# Patient Record
Sex: MALE | Race: BLACK OR AFRICAN AMERICAN | NOT HISPANIC OR LATINO | Employment: OTHER | URBAN - METROPOLITAN AREA
[De-identification: names, ages, dates, MRNs, and addresses within clinical notes are randomized per-mention and may not be internally consistent; named-entity substitution may affect disease eponyms.]

---

## 2017-02-28 ENCOUNTER — OFFICE VISIT (OUTPATIENT)
Dept: URGENT CARE | Facility: CLINIC | Age: 72
End: 2017-02-28

## 2017-02-28 VITALS
RESPIRATION RATE: 20 BRPM | WEIGHT: 206 LBS | SYSTOLIC BLOOD PRESSURE: 162 MMHG | DIASTOLIC BLOOD PRESSURE: 82 MMHG | HEART RATE: 85 BPM | BODY MASS INDEX: 27.9 KG/M2 | TEMPERATURE: 99.3 F | OXYGEN SATURATION: 93 % | HEIGHT: 72 IN

## 2017-02-28 DIAGNOSIS — E78.00 HYPERCHOLESTEREMIA: ICD-10-CM

## 2017-02-28 DIAGNOSIS — Z76.0 MEDICATION REFILL: ICD-10-CM

## 2017-02-28 DIAGNOSIS — I10 ESSENTIAL HYPERTENSION: ICD-10-CM

## 2017-02-28 PROCEDURE — 99204 OFFICE O/P NEW MOD 45 MIN: CPT | Performed by: NURSE PRACTITIONER

## 2017-02-28 RX ORDER — AMLODIPINE BESYLATE 10 MG/1
10 TABLET ORAL DAILY
Qty: 30 TAB | Refills: 0 | Status: SHIPPED | OUTPATIENT
Start: 2017-02-28 | End: 2017-03-23 | Stop reason: SDUPTHER

## 2017-02-28 RX ORDER — LISINOPRIL 20 MG/1
20 TABLET ORAL DAILY
Qty: 30 TAB | Refills: 0 | Status: SHIPPED | OUTPATIENT
Start: 2017-02-28 | End: 2017-03-23 | Stop reason: SDUPTHER

## 2017-02-28 RX ORDER — LOVASTATIN 40 MG/1
40 TABLET ORAL NIGHTLY
COMMUNITY
End: 2017-03-23

## 2017-02-28 RX ORDER — AMLODIPINE BESYLATE 10 MG/1
10 TABLET ORAL DAILY
COMMUNITY
End: 2017-03-23

## 2017-02-28 RX ORDER — LISINOPRIL 20 MG/1
20 TABLET ORAL DAILY
COMMUNITY
End: 2017-03-23

## 2017-02-28 RX ORDER — LABETALOL 200 MG/1
200 TABLET, FILM COATED ORAL 2 TIMES DAILY
COMMUNITY
End: 2017-03-23

## 2017-02-28 RX ORDER — ASPIRIN 81 MG/1
81 TABLET, CHEWABLE ORAL DAILY
COMMUNITY

## 2017-02-28 RX ORDER — LABETALOL 200 MG/1
200 TABLET, FILM COATED ORAL 2 TIMES DAILY
Qty: 60 TAB | Refills: 0 | Status: SHIPPED | OUTPATIENT
Start: 2017-02-28 | End: 2017-03-23 | Stop reason: SDUPTHER

## 2017-02-28 RX ORDER — LOVASTATIN 40 MG/1
40 TABLET ORAL
Qty: 30 TAB | Refills: 0 | Status: SHIPPED | OUTPATIENT
Start: 2017-02-28 | End: 2017-03-23 | Stop reason: SDUPTHER

## 2017-02-28 NOTE — MR AVS SNAPSHOT
"        Varinder Waller   2017 11:00 AM   Office Visit   MRN: [id number]    Department:  [facility]   Dept Phone:  [phone]    Description:  Male : 1945   Provider:  ALLI Lang           Reason for Visit     Medication Refill refill on high blood pressure medication      Allergies as of 2017     No Known Allergies      You were diagnosed with     Essential hypertension   [5729278]       Hypercholesteremia   [661779]       Medication refill   [[id number]]         Vital Signs     Blood Pressure Pulse Temperature Respirations Height Weight    162/82 mmHg 85 37.4 °C (99.3 °F) 20 1.829 m (6' 0.01\") 93.441 kg (206 lb)    Body Mass Index Oxygen Saturation Smoking Status             27.93 kg/m2 93% Former Smoker         Basic Information     Date Of Birth Sex Race Ethnicity Preferred Language    1945 Male Black or  Non- English      Health Maintenance        Date Due Completion Dates    IMM DTaP/Tdap/Td Vaccine (1 - Tdap) 1966 ---    COLONOSCOPY 1997 ---    IMM ZOSTER VACCINE 2007 ---    IMM PNEUMOCOCCAL 65+ (ADULT) LOW/MEDIUM RISK SERIES (1 of 2 - PCV13) 2012 ---    IMM INFLUENZA (1) 2016 ---            Current Immunizations     No immunizations on file.      Below and/or attached are the medications your provider expects you to take. Review all of your home medications and newly ordered medications with your provider and/or pharmacist. Follow medication instructions as directed by your provider and/or pharmacist. Please keep your medication list with you and share with your provider. Update the information when medications are discontinued, doses are changed, or new medications (including over-the-counter products) are added; and carry medication information at all times in the event of emergency situations     Allergies:  No Known Allergies          Medications  Valid as of: 2017 - 12:04 PM    Generic Name Brand Name Tablet " Size Instructions for use    AmLODIPine Besylate (Tab) NORVASC 10 MG Take 10 mg by mouth every day.        AmLODIPine Besylate (Tab) NORVASC 10 MG Take 1 Tab by mouth every day.        Aspirin (Chew Tab) ASA 81 MG Take 81 mg by mouth every day.        Labetalol HCl (Tab) NORMODYNE 200 MG Take 200 mg by mouth 2 times a day.        Labetalol HCl (Tab) NORMODYNE 200 MG Take 1 Tab by mouth 2 times a day.        Lisinopril (Tab) PRINIVIL 20 MG Take 20 mg by mouth every day.        Lisinopril (Tab) PRINIVIL 20 MG Take 1 Tab by mouth every day.        Lovastatin (Tab) MEVACOR 40 MG Take 40 mg by mouth every evening.        Lovastatin (Tab) MEVACOR 40 MG Take 1 Tab by mouth every bedtime.        .                 Medicines prescribed today were sent to:     [facility]/PHARMACY #[id number] - [place], [place] - [address]    [address] [place] [address]    Phone: [phone] Fax: [phone]    Open 24 Hours?: No      Medication refill instructions:       If your prescription bottle indicates you have medication refills left, it is not necessary to call your provider’s office. Please contact your pharmacy and they will refill your medication.    If your prescription bottle indicates you do not have any refills left, you may request refills at any time through one of the following ways: The online [employer] system (except Urgent Care), by calling your provider’s office, or by asking your pharmacy to contact your provider’s office with a refill request. Medication refills are processed only during regular business hours and may not be available until the next business day. Your provider may request additional information or to have a follow-up visit with you prior to refilling your medication.   *Please Note: Medication refills are assigned a new Rx number when refilled electronically. Your pharmacy may indicate that no refills were authorized even though a new prescription for the same medication is available at the pharmacy.  Please request the medicine by name with the pharmacy before contacting your provider for a refill.           [employer] Access Code: [email or web address]  Expires: 3/30/2017 12:04 PM    Your email address is not on file at [facility].  Email Addresses are required for you to sign up for [employer], please contact [phone] to verify your personal information and to provide your email address prior to attempting to register for [employer].    Varinder Waller  [address]  [place] [address]    [employer]  A secure, online tool to manage your health information     [facility]’s [employer]® is a secure, online tool that connects you to your personalized health information from the privacy of your home -- day or night - making it very easy for you to manage your healthcare. Once the activation process is completed, you can even access your medical information using the [employer] regulo, which is available for free in the Apple Regulo store or Google Play store.     To learn more about [employer], visit [email or web address]    There are two levels of access available (as shown below):   My Chart Features  [facility] Primary Care Doctor [facility]  Specialists [facility]  Urgent  Care Non-[facility] Primary Care Doctor   Email your healthcare team securely and privately 24/7 X X X    Manage appointments: schedule your next appointment; view details of past/upcoming appointments X      Request prescription refills. X      View recent personal medical records, including lab and immunizations X X X X   View health record, including health history, allergies, medications X X X X   Read reports about your outpatient visits, procedures, consult and ER notes X X X X   See your discharge summary, which is a recap of your hospital and/or ER visit that includes your diagnosis, lab results, and care plan X X  X     How to register for [employer]:  Once your e-mail address has been verified, follow the following steps to sign up for [employer].     1. Go  to  [email or web address]  2. Click on the Sign Up Now box, which takes you to the New Member Sign Up page. You will need to provide the following information:  a. Enter your [employer] Access Code exactly as it appears at the top of this page. (You will not need to use this code after you’ve completed the sign-up process. If you do not sign up before the expiration date, you must request a new code.)   b. Enter your date of birth.   c. Enter your home email address.   d. Click Submit, and follow the next screen’s instructions.  3. Create a [employer] ID. This will be your [employer] login ID and cannot be changed, so think of one that is secure and easy to remember.  4. Create a [employer] password. You can change your password at any time.  5. Enter your Password Reset Question and Answer. This can be used at a later time if you forget your password.   6. Enter your e-mail address. This allows you to receive e-mail notifications when new information is available in [employer].  7. Click Sign Up. You can now view your health information.    For assistance activating your [employer] account, call [phone]

## 2017-02-28 NOTE — PROGRESS NOTES
Chief Complaint   Patient presents with   • Medication Refill     refill on high blood pressure medication       HISTORY OF PRESENT ILLNESS: Patient is a 71 y.o. male who presents today requesting medication refill. He just moved to the area from [place] and has yet to establish care with a PCP. He is almost out of his HTN and cholesterol medication. He has been on the medication for approximately three years without significant changes and has tolerated well. He otherwise feels well. He denies chest pain, shortness of breath, fatigue, palpations, or weakness. He did not take his BP meds yet this morning. He has a home BP cuff which he uses to check his pressures often.     There are no active problems to display for this patient.      Allergies:Review of patient's allergies indicates no known allergies.    Current Outpatient Prescriptions Ordered in [facility]   Medication Sig Dispense Refill   • amlodipine (NORVASC) 10 MG Tab Take 10 mg by mouth every day.     • lovastatin (MEVACOR) 40 MG tablet Take 40 mg by mouth every evening.     • lisinopril (PRINIVIL) 20 MG Tab Take 20 mg by mouth every day.     • labetalol (NORMODYNE) 200 MG Tab Take 200 mg by mouth 2 times a day.     • aspirin (ASA) 81 MG Chew Tab chewable tablet Take 81 mg by mouth every day.     • amlodipine (NORVASC) 10 MG Tab Take 1 Tab by mouth every day. 30 Tab 0   • lovastatin (MEVACOR) 40 MG tablet Take 1 Tab by mouth every bedtime. 30 Tab 0   • lisinopril (PRINIVIL) 20 MG Tab Take 1 Tab by mouth every day. 30 Tab 0   • labetalol (NORMODYNE) 200 MG Tab Take 1 Tab by mouth 2 times a day. 60 Tab 0     No current Epic-ordered facility-administered medications on file.       Past Medical History   Diagnosis Date   • Hypertension    • Hyperlipidemia         Social History   Substance Use Topics   • Smoking status: Former Smoker   • Smokeless tobacco: None   • Alcohol Use: No       No family status information on file.   History reviewed. No pertinent  "family history.    ROS:  Review of Systems   Constitutional: Negative for fever, chills, weight loss, malaise, and fatigue.   HENT: Negative for ear pain, nosebleeds, congestion, sore throat and neck pain.    Eyes: Negative for vision changes.   Neuro: Negative for headache, sensory changes, weakness, seizure, LOC.   Cardiovascular: Negative for chest pain, palpitations, orthopnea and leg swelling.   Respiratory: Negative for cough, sputum production, shortness of breath and wheezing.   Gastrointestinal: Negative for abdominal pain, nausea, vomiting or diarrhea.   Genitourinary: Negative for dysuria, urgency and frequency.  Musculoskeletal: Negative for falls, neck pain, back pain, joint pain, myalgias.   Skin: Negative for rash, diaphoresis.     Exam:  Blood pressure 162/82, pulse 85, temperature 37.4 °C (99.3 °F), resp. rate 20, height 1.829 m (6' 0.01\"), weight 93.441 kg (206 lb), SpO2 93 %.  General: well-nourished, well-developed male in NAD  Head: normocephalic, atraumatic  Eyes: PERRLA, no conjunctival injection, acuity grossly intact, lids normal.  Ears: normal shape and symmetry, no tenderness, no discharge. External canals are without any significant edema or erythema. Tympanic membranes are without any inflammation, no effusion. Gross auditory acuity is intact.  Nose: symmetrical without tenderness, no discharge.  Mouth/Throat: reasonable hygiene, no erythema, exudates or tonsillar enlargement.  Neck: no masses, range of motion within normal limits, no tracheal deviation. No obvious thyroid enlargement.   Lymph: no cervical adenopathy. No supraclavicular adenopathy.   Neuro: alert and oriented. Cranial nerves 1-12 grossly intact. No sensory deficit.   Cardiovascular: regular rate and rhythm. No edema.  Pulmonary: no distress. Chest is symmetrical with respiration, no wheezes, crackles, or rhonchi.   Musculoskeletal: no clubbing, appropriate muscle tone, gait is stable.  Skin: warm, dry, intact, no " clubbing, no cyanosis, no rashes.   Psych: appropriate mood, affect, judgement.         Assessment/Plan:  1. Essential hypertension  amlodipine (NORVASC) 10 MG Tab    lisinopril (PRINIVIL) 20 MG Tab    labetalol (NORMODYNE) 200 MG Tab   2. Hypercholesteremia  lovastatin (MEVACOR) 40 MG tablet   3. Medication refill  amlodipine (NORVASC) 10 MG Tab    lovastatin (MEVACOR) 40 MG tablet    lisinopril (PRINIVIL) 20 MG Tab    labetalol (NORMODYNE) 200 MG Tab       I have filled the patient's prescriptions for one month, he will otherwise need to follow up with a PCP for further care and medication management.   Take BP meds today after returning home as BP is slightly elevated at 162/82.   Supportive care, differential diagnoses, and indications for immediate follow-up discussed with patient.   Pathogenesis of diagnosis discussed including typical length and natural progression.   Instructed to return to clinic or nearest emergency department for any change in condition, further concerns, or worsening of symptoms.  Patient states understanding of the plan of care and discharge instructions.  Instructed to make an appointment, for follow up and establish care, with a primary care provider.        Please note that this dictation was created using voice recognition software. I have made every reasonable attempt to correct obvious errors, but I expect that there are errors of grammar and possibly content that I did not discover before finalizing the note.      KATHE Quinones.

## 2017-03-23 ENCOUNTER — OFFICE VISIT (OUTPATIENT)
Dept: MEDICAL GROUP | Age: 72
End: 2017-03-23

## 2017-03-23 VITALS
TEMPERATURE: 98.3 F | RESPIRATION RATE: 18 BRPM | OXYGEN SATURATION: 97 % | HEART RATE: 68 BPM | SYSTOLIC BLOOD PRESSURE: 116 MMHG | BODY MASS INDEX: 27.63 KG/M2 | DIASTOLIC BLOOD PRESSURE: 70 MMHG | HEIGHT: 72 IN | WEIGHT: 204 LBS

## 2017-03-23 DIAGNOSIS — I10 ESSENTIAL HYPERTENSION: ICD-10-CM

## 2017-03-23 DIAGNOSIS — G89.29 CHRONIC RIGHT HIP PAIN: ICD-10-CM

## 2017-03-23 DIAGNOSIS — M25.551 CHRONIC RIGHT HIP PAIN: ICD-10-CM

## 2017-03-23 DIAGNOSIS — E78.00 HYPERCHOLESTEREMIA: ICD-10-CM

## 2017-03-23 DIAGNOSIS — Z76.89 ENCOUNTER TO ESTABLISH CARE: ICD-10-CM

## 2017-03-23 DIAGNOSIS — Z00.00 PREVENTATIVE HEALTH CARE: ICD-10-CM

## 2017-03-23 DIAGNOSIS — E78.5 HYPERLIPIDEMIA, UNSPECIFIED HYPERLIPIDEMIA TYPE: ICD-10-CM

## 2017-03-23 PROCEDURE — 99204 OFFICE O/P NEW MOD 45 MIN: CPT | Performed by: PHYSICIAN ASSISTANT

## 2017-03-23 RX ORDER — AMLODIPINE BESYLATE 10 MG/1
10 TABLET ORAL DAILY
Qty: 90 TAB | Refills: 1 | Status: SHIPPED | OUTPATIENT
Start: 2017-03-23 | End: 2017-09-14 | Stop reason: SDUPTHER

## 2017-03-23 RX ORDER — LOVASTATIN 40 MG/1
40 TABLET ORAL
Qty: 90 TAB | Refills: 1 | Status: SHIPPED | OUTPATIENT
Start: 2017-03-23 | End: 2017-09-14 | Stop reason: SDUPTHER

## 2017-03-23 RX ORDER — LISINOPRIL 20 MG/1
20 TABLET ORAL DAILY
Qty: 90 TAB | Refills: 1 | Status: SHIPPED | OUTPATIENT
Start: 2017-03-23 | End: 2017-09-14 | Stop reason: SDUPTHER

## 2017-03-23 RX ORDER — LABETALOL 200 MG/1
200 TABLET, FILM COATED ORAL 2 TIMES DAILY
Qty: 180 TAB | Refills: 1 | Status: SHIPPED | OUTPATIENT
Start: 2017-03-23 | End: 2017-09-14 | Stop reason: SDUPTHER

## 2017-03-23 ASSESSMENT — PATIENT HEALTH QUESTIONNAIRE - PHQ9: CLINICAL INTERPRETATION OF PHQ2 SCORE: 0

## 2017-03-23 NOTE — ASSESSMENT & PLAN NOTE
History of hyperlipidemia. Currently taking lovastatin 40 mg nightly. Last labs were in October. Labs were well controlled.

## 2017-03-23 NOTE — MR AVS SNAPSHOT
"        Varinder Waller   3/23/2017 3:00 PM   Office Visit   MRN: [id number]    Department:  [facility]   Dept Phone:  [phone]    Description:  Male : 1945   Provider:  Rah Ibarra PA-C           Reason for Visit     Establish Care f/v on BP medication and seen at [facility]      Allergies as of 3/23/2017     No Known Allergies      You were diagnosed with     Essential hypertension   [5799881]       Hyperlipidemia, unspecified hyperlipidemia type   [7718106]       Chronic right hip pain   [397295]       Encounter to establish care   [182431]       Hypercholesteremia   [113890]       Preventative health care   [669526]         Vital Signs     Blood Pressure Pulse Temperature Respirations Height Weight    116/70 mmHg 68 36.8 °C (98.3 °F) 18 1.829 m (6' 0.01\") 92.534 kg (204 lb)    Body Mass Index Oxygen Saturation Smoking Status             27.66 kg/m2 97% Former Smoker         Basic Information     Date Of Birth Sex Race Ethnicity Preferred Language    1945 Male Black or  Non- English      Problem List              ICD-10-CM Priority Class Noted - Resolved    Hypertension I10   3/23/2017 - Present    Hyperlipidemia E78.5   3/23/2017 - Present    Chronic right hip pain M25.551, G89.29   3/23/2017 - Present    Preventative health care Z00.00   3/23/2017 - Present      Health Maintenance        Date Due Completion Dates    IMM DTaP/Tdap/Td Vaccine (1 - Tdap) 1966 ---    IMM ZOSTER VACCINE 2007 ---    IMM PNEUMOCOCCAL 65+ (ADULT) LOW/MEDIUM RISK SERIES (1 of 2 - PCV13) 2012 ---    IMM INFLUENZA (1) 2016 ---    COLONOSCOPY 10/3/2026 10/3/2016 (Done)    Override on 10/3/2016: Done (Had FIT testing)            Current Immunizations     No immunizations on file.      Below and/or attached are the medications your provider expects you to take. Review all of your home medications and newly ordered medications with your provider and/or pharmacist. Follow " medication instructions as directed by your provider and/or pharmacist. Please keep your medication list with you and share with your provider. Update the information when medications are discontinued, doses are changed, or new medications (including over-the-counter products) are added; and carry medication information at all times in the event of emergency situations     Allergies:  No Known Allergies          Medications  Valid as of: March 23, 2017 -  3:01 PM    Generic Name Brand Name Tablet Size Instructions for use    AmLODIPine Besylate (Tab) NORVASC 10 MG Take 1 Tab by mouth every day.        Aspirin (Chew Tab) ASA 81 MG Take 81 mg by mouth every day.        Labetalol HCl (Tab) NORMODYNE 200 MG Take 1 Tab by mouth 2 times a day.        Lisinopril (Tab) PRINIVIL 20 MG Take 1 Tab by mouth every day.        Lovastatin (Tab) MEVACOR 40 MG Take 1 Tab by mouth every bedtime.        .                 Medicines prescribed today were sent to:     [facility]/PHARMACY #[id number] - [place], [place] - [address]    [address] [place] [address]    Phone: [phone] Fax: [phone]    Open 24 Hours?: No      Medication refill instructions:       If your prescription bottle indicates you have medication refills left, it is not necessary to call your provider’s office. Please contact your pharmacy and they will refill your medication.    If your prescription bottle indicates you do not have any refills left, you may request refills at any time through one of the following ways: The online [employer] system (except Urgent Care), by calling your provider’s office, or by asking your pharmacy to contact your provider’s office with a refill request. Medication refills are processed only during regular business hours and may not be available until the next business day. Your provider may request additional information or to have a follow-up visit with you prior to refilling your medication.   *Please Note: Medication refills are  assigned a new Rx number when refilled electronically. Your pharmacy may indicate that no refills were authorized even though a new prescription for the same medication is available at the pharmacy. Please request the medicine by name with the pharmacy before contacting your provider for a refill.           [employer] Access Code: [email or web address]  Expires: 3/30/2017  1:04 PM    Your email address is not on file at [facility].  Email Addresses are required for you to sign up for [employer], please contact [phone] to verify your personal information and to provide your email address prior to attempting to register for [employer].    Varinderedison Waller  [address]  [place] [address]    [employer]  A secure, online tool to manage your health information     [facility]’s [employer]® is a secure, online tool that connects you to your personalized health information from the privacy of your home -- day or night - making it very easy for you to manage your healthcare. Once the activation process is completed, you can even access your medical information using the [employer] regulo, which is available for free in the Apple Regulo store or Google Play store.     To learn more about [employer], visit [email or web address]    There are two levels of access available (as shown below):   My Chart Features  [facility] Primary Care Doctor [facility]  Specialists [facility]  Urgent  Care Non-[facility] Primary Care Doctor   Email your healthcare team securely and privately 24/7 X X X    Manage appointments: schedule your next appointment; view details of past/upcoming appointments X      Request prescription refills. X      View recent personal medical records, including lab and immunizations X X X X   View health record, including health history, allergies, medications X X X X   Read reports about your outpatient visits, procedures, consult and ER notes X X X X   See your discharge summary, which is a recap of your hospital and/or ER visit that  includes your diagnosis, lab results, and care plan X X  X     How to register for [employer] [facility]:  Once your e-mail address has been verified, follow the following steps to sign up for [employer] [facility].     1. Go to  [email or web address]  2. Click on the Sign Up Now box, which takes you to the New Member Sign Up page. You will need to provide the following information:  a. Enter your [employer] [facility] Access Code exactly as it appears at the top of this page. (You will not need to use this code after you’ve completed the sign-up process. If you do not sign up before the expiration date, you must request a new code.)   b. Enter your date of birth.   c. Enter your home email address.   d. Click Submit, and follow the next screen’s instructions.  3. Create a [employer] ID. This will be your [employer] login ID and cannot be changed, so think of one that is secure and easy to remember.  4. Create a [employer] password. You can change your password at any time.  5. Enter your Password Reset Question and Answer. This can be used at a later time if you forget your password.   6. Enter your e-mail address. This allows you to receive e-mail notifications when new information is available in [employer] [facility].  7. Click Sign Up. You can now view your health information.    For assistance activating your [employer] [facility] account, call [phone]

## 2017-03-23 NOTE — ASSESSMENT & PLAN NOTE
This is a 71-year-old male who is here today to establish care also to get refills of his hypertension medication. Looks like he may have had a hypertensive crisis. Has been on these 3 blood pressure medications with his blood pressure being well controlled. Is questioning no wise on 3 medications. Is asymptomatic when taking the medications. No history of a MI but may have had a stroke in the past. Recently moved from [place].

## 2017-03-23 NOTE — PROGRESS NOTES
Subjective:   Varinder Waller is a 71 y.o. male here today to establish care and to discuss his history of hypertension.    Hypertension  This is a 71-year-old male who is here today to establish care also to get refills of his hypertension medication. Looks like he may have had a hypertensive crisis. Has been on these 3 blood pressure medications with his blood pressure being well controlled. Is questioning no wise on 3 medications. Is asymptomatic when taking the medications. No history of a MI but may have had a stroke in the past. Recently moved from [place].    Hyperlipidemia  History of hyperlipidemia. Currently taking lovastatin 40 mg nightly. Last labs were in October. Labs were well controlled.    Chronic right hip pain  Has a chronic history of right hip pain. States it difficult to walk for long distances. Requesting a license plate placard. His last primary care at [facility] gave him the placard. States that his wife uses a walker. She does not drive. He denies any use of ambulatory device.    Preventative health care  Every year he receives the fit card to check for blood in his stool. In October it was negative. Has had no history of any surgeries. Does not want to have a colonoscopy.       Current medicines (including changes today)  Current Outpatient Prescriptions   Medication Sig Dispense Refill   • amlodipine (NORVASC) 10 MG Tab Take 1 Tab by mouth every day. 90 Tab 1   • labetalol (NORMODYNE) 200 MG Tab Take 1 Tab by mouth 2 times a day. 180 Tab 1   • lisinopril (PRINIVIL) 20 MG Tab Take 1 Tab by mouth every day. 90 Tab 1   • lovastatin (MEVACOR) 40 MG tablet Take 1 Tab by mouth every bedtime. 90 Tab 1   • aspirin (ASA) 81 MG Chew Tab chewable tablet Take 81 mg by mouth every day.       No current facility-administered medications for this visit.     He  has a past medical history of Hypertension and Hyperlipidemia.    ROS   No chest pain, no shortness of breath, no abdominal pain and all other systems  "were reviewed and are negative.       Objective:     Blood pressure 116/70, pulse 68, temperature 36.8 °C (98.3 °F), resp. rate 18, height 1.829 m (6' 0.01\"), weight 92.534 kg (204 lb), SpO2 97 %. Body mass index is 27.66 kg/(m^2).   Physical Exam:  Constitutional: Alert, no distress.  Skin: Warm, dry, good turgor, no rashes in visible areas.  Eye: Equal, round and reactive, conjunctiva clear, lids normal.  ENMT: Lips without lesions, good dentition, oropharynx clear.  Neck: Trachea midline, no masses.   Lymph: No cervical or supraclavicular lymphadenopathy  Respiratory: Unlabored respiratory effort, lungs clear to auscultation, no wheezes, no ronchi.  Cardiovascular: Normal S1, S2, no murmur, no edema.  Abdomen: Soft, non-tender, no masses.  Psych: Alert and oriented x3, normal affect and mood.        Assessment and Plan:   The following treatment plan was discussed    1. Essential hypertension  Chronic condition and controlled. Advised patient that his medications are controlling his blood pressure should be continued. Prescribed Norvasc, labetalol and lisinopril as directed.  - amlodipine (NORVASC) 10 MG Tab; Take 1 Tab by mouth every day.  Dispense: 90 Tab; Refill: 1  - labetalol (NORMODYNE) 200 MG Tab; Take 1 Tab by mouth 2 times a day.  Dispense: 180 Tab; Refill: 1  - lisinopril (PRINIVIL) 20 MG Tab; Take 1 Tab by mouth every day.  Dispense: 90 Tab; Refill: 1    2. Hyperlipidemia, unspecified hyperlipidemia type  Chronic condition likely controlled. Prescribed atorvastatin as directed. Will obtain previous medical records.  - lovastatin (MEVACOR) 40 MG tablet; Take 1 Tab by mouth every bedtime.  Dispense: 90 Tab; Refill: 1    3. Chronic right hip pain  Chronic condition. Dr. Mckinney filled out the DM[employer] placard form. I gave him 6 months temporary disability.    4. Encounter to establish care    6. Preventative health care  We'll obtain previous medical records. Discussed with importance of doing a colonoscopy " versus FIT testing. We'll discuss in the future.        Followup: Return if symptoms worsen or fail to improve.    Please note that this dictation was created using voice recognition software. I have made every reasonable attempt to correct obvious errors, but I expect that there are errors of grammar and possibly content that I did not discover before finalizing the note.

## 2017-03-23 NOTE — Clinical Note
[facility]  Rah Ibarra PA-C  [address]  [place] [address]  Fax: [phone]   Authorization for Release/Disclosure of   Protected Health Information   Name: VARINDER WALLER : 1945 SSN: [id number]   Address: [id number]  [place] [facility] [place] [address] Phone:    [phone] (home)    I authorize the entity listed below to release/disclose the PHI below to:   [facility]/Rah Ibarra PA-C and Rah Ibarra PA-C   Provider or Entity Name:  [facility]   Address   City, [facility], [place], [place]   Phone:      Fax:     Reason for request: continuity of care   Information to be released:    [  ] LAST COLONOSCOPY,  including any PATH REPORT and follow-up  [  ] LAST FIT/COLOGUARD RESULT [  ] LAST DEXA  [  ] LAST MAMMOGRAM  [  ] LAST PAP  [  ] LAST LABS [  ] RETINA EXAM REPORT  [  ] IMMUNIZATION RECORDS  [ xxx ] Release all info      [  ] Check here and initial the line next to each item to release ALL health information INCLUDING  _____ Care and treatment for drug and / or alcohol abuse  _____ HIV testing, infection status, or AIDS  _____ Genetic Testing    DATES OF SERVICE OR TIME PERIOD TO BE DISCLOSED: _____________  I understand and acknowledge that:  * This Authorization may be revoked at any time by you in writing, except if your health information has already been used or disclosed.  * Your health information that will be used or disclosed as a result of you signing this authorization could be re-disclosed by the recipient. If this occurs, your re-disclosed health information may no longer be protected by State or Federal laws.  * You may refuse to sign this Authorization. Your refusal will not affect your ability to obtain treatment.  * This Authorization becomes effective upon signing and will  on (date) __________.      If no date is indicated, this Authorization will  one (1) year from the signature date.    Name: Varinder Waller    Signature:   Date:      3/23/2017       PLEASE FAX REQUESTED RECORDS BACK TO: [phone]

## 2017-03-23 NOTE — ASSESSMENT & PLAN NOTE
Every year he receives the fit card to check for blood in his stool. In October it was negative. Has had no history of any surgeries. Does not want to have a colonoscopy.

## 2017-03-23 NOTE — ASSESSMENT & PLAN NOTE
Has a chronic history of right hip pain. States it difficult to walk for long distances. Requesting a license plate placard. His last primary care at [facility] gave him the placard. States that his wife uses a walker. She does not drive. He denies any use of ambulatory device.

## 2017-04-25 PROBLEM — R73.03 PREDIABETES: Status: ACTIVE | Noted: 2017-04-25

## 2017-04-25 PROBLEM — Z87.448 HISTORY OF CHRONIC KIDNEY DISEASE: Status: ACTIVE | Noted: 2017-04-25

## 2017-09-14 DIAGNOSIS — E78.00 HYPERCHOLESTEREMIA: ICD-10-CM

## 2017-09-14 DIAGNOSIS — I10 ESSENTIAL HYPERTENSION: ICD-10-CM

## 2017-09-14 RX ORDER — LOVASTATIN 40 MG/1
40 TABLET ORAL
Qty: 90 TAB | Refills: 0 | Status: SHIPPED | OUTPATIENT
Start: 2017-09-14 | End: 2017-12-11 | Stop reason: SDUPTHER

## 2017-09-14 RX ORDER — LABETALOL 200 MG/1
200 TABLET, FILM COATED ORAL 2 TIMES DAILY
Qty: 180 TAB | Refills: 0 | Status: SHIPPED | OUTPATIENT
Start: 2017-09-14 | End: 2017-12-11 | Stop reason: SDUPTHER

## 2017-09-14 RX ORDER — AMLODIPINE BESYLATE 10 MG/1
10 TABLET ORAL DAILY
Qty: 90 TAB | Refills: 0 | Status: SHIPPED | OUTPATIENT
Start: 2017-09-14 | End: 2017-12-11 | Stop reason: SDUPTHER

## 2017-09-14 RX ORDER — LISINOPRIL 20 MG/1
20 TABLET ORAL DAILY
Qty: 90 TAB | Refills: 0 | Status: SHIPPED | OUTPATIENT
Start: 2017-09-14 | End: 2017-12-11 | Stop reason: SDUPTHER

## 2017-09-14 NOTE — TELEPHONE ENCOUNTER
Was the patient seen in the last year in this department? Yes     Does patient have an active prescription for medications requested? No     Received Request Via: Pharmacy     Last Visit: 3/23/17    Last Labs: None

## 2017-12-11 DIAGNOSIS — E78.00 HYPERCHOLESTEREMIA: ICD-10-CM

## 2017-12-11 DIAGNOSIS — I10 ESSENTIAL HYPERTENSION: ICD-10-CM

## 2017-12-11 RX ORDER — AMLODIPINE BESYLATE 10 MG/1
10 TABLET ORAL DAILY
Qty: 90 TAB | Refills: 0 | Status: SHIPPED | OUTPATIENT
Start: 2017-12-11 | End: 2018-03-10 | Stop reason: SDUPTHER

## 2017-12-11 RX ORDER — LISINOPRIL 20 MG/1
20 TABLET ORAL DAILY
Qty: 90 TAB | Refills: 0 | Status: SHIPPED | OUTPATIENT
Start: 2017-12-11 | End: 2018-03-10 | Stop reason: SDUPTHER

## 2017-12-11 RX ORDER — LOVASTATIN 40 MG/1
40 TABLET ORAL
Qty: 90 TAB | Refills: 0 | Status: SHIPPED | OUTPATIENT
Start: 2017-12-11 | End: 2018-03-10 | Stop reason: SDUPTHER

## 2017-12-11 RX ORDER — LABETALOL 200 MG/1
200 TABLET, FILM COATED ORAL 2 TIMES DAILY
Qty: 180 TAB | Refills: 0 | Status: SHIPPED | OUTPATIENT
Start: 2017-12-11 | End: 2018-03-10 | Stop reason: SDUPTHER

## 2018-12-06 ENCOUNTER — OFFICE VISIT (OUTPATIENT)
Dept: MEDICAL GROUP | Facility: MEDICAL CENTER | Age: 73
End: 2018-12-06

## 2018-12-06 VITALS
HEART RATE: 60 BPM | HEIGHT: 72 IN | TEMPERATURE: 98.1 F | OXYGEN SATURATION: 96 % | WEIGHT: 186.6 LBS | BODY MASS INDEX: 25.27 KG/M2 | SYSTOLIC BLOOD PRESSURE: 128 MMHG | DIASTOLIC BLOOD PRESSURE: 70 MMHG

## 2018-12-06 DIAGNOSIS — Z12.11 ENCOUNTER FOR FIT (FECAL IMMUNOCHEMICAL TEST) SCREENING: ICD-10-CM

## 2018-12-06 DIAGNOSIS — E78.5 HYPERLIPIDEMIA, UNSPECIFIED HYPERLIPIDEMIA TYPE: ICD-10-CM

## 2018-12-06 DIAGNOSIS — I10 ESSENTIAL HYPERTENSION: ICD-10-CM

## 2018-12-06 DIAGNOSIS — E78.00 HYPERCHOLESTEREMIA: ICD-10-CM

## 2018-12-06 DIAGNOSIS — R73.03 PREDIABETES: ICD-10-CM

## 2018-12-06 DIAGNOSIS — M25.551 CHRONIC RIGHT HIP PAIN: ICD-10-CM

## 2018-12-06 DIAGNOSIS — G89.29 CHRONIC RIGHT HIP PAIN: ICD-10-CM

## 2018-12-06 DIAGNOSIS — Z12.5 SCREENING PSA (PROSTATE SPECIFIC ANTIGEN): ICD-10-CM

## 2018-12-06 PROCEDURE — 99214 OFFICE O/P EST MOD 30 MIN: CPT | Performed by: PHYSICIAN ASSISTANT

## 2018-12-06 RX ORDER — LOVASTATIN 40 MG/1
40 TABLET ORAL
Qty: 90 TAB | Refills: 3 | Status: SHIPPED | OUTPATIENT
Start: 2018-12-06

## 2018-12-06 RX ORDER — LISINOPRIL 20 MG/1
20 TABLET ORAL DAILY
Qty: 90 TAB | Refills: 3 | Status: SHIPPED | OUTPATIENT
Start: 2018-12-06 | End: 2019-10-20 | Stop reason: SDUPTHER

## 2018-12-06 RX ORDER — AMLODIPINE BESYLATE 10 MG/1
10 TABLET ORAL DAILY
Qty: 90 TAB | Refills: 3 | Status: SHIPPED | OUTPATIENT
Start: 2018-12-06 | End: 2019-10-20 | Stop reason: SDUPTHER

## 2018-12-06 RX ORDER — LABETALOL 200 MG/1
200 TABLET, FILM COATED ORAL 2 TIMES DAILY
Qty: 90 TAB | Refills: 3 | Status: SHIPPED | OUTPATIENT
Start: 2018-12-06

## 2018-12-06 ASSESSMENT — PATIENT HEALTH QUESTIONNAIRE - PHQ9: CLINICAL INTERPRETATION OF PHQ2 SCORE: 0

## 2018-12-06 NOTE — ASSESSMENT & PLAN NOTE
Has a chronic history of hyperlipidemia.  Takes lovastatin 40 mg at bedtime.  No recent labs performed since he left [place].

## 2018-12-06 NOTE — PROGRESS NOTES
Subjective:   Varinder Waller is a 73 y.o. male here today for hypertension, hyperlipidemia, chronic right hip pain and prediabetes.    Hypertension  This is a 73-year-old male who is here today to discuss his hypertension.  Would like to cut back on medication.  Thinks it is too much to be taking 3 medications.  Blood pressure though is well controlled.  Denies any history of chest pain or shortness of breath.    Hyperlipidemia  Has a chronic history of hyperlipidemia.  Takes lovastatin 40 mg at bedtime.  No recent labs performed since he left [place].    Chronic right hip pain  Has a chronic history of right hip pain.  Has a plaque.  From [place] and then I did a forearm last time he saw me.  He is requesting a longer course for the placard.  States an open he was given chronic disability for his chronic condition.    Prediabetes  Records indicate his last A1c was 6.1.  He has not followed up and done labs recently.       Current medicines (including changes today)  Current Outpatient Prescriptions   Medication Sig Dispense Refill   • amLODIPine (NORVASC) 10 MG Tab Take 1 Tab by mouth every day. 90 Tab 3   • labetalol (NORMODYNE) 200 MG Tab Take 1 Tab by mouth 2 times a day. 90 Tab 3   • lisinopril (PRINIVIL) 20 MG Tab Take 1 Tab by mouth every day. 90 Tab 3   • lovastatin (MEVACOR) 40 MG tablet Take 1 Tab by mouth every bedtime. 90 Tab 3   • aspirin (ASA) 81 MG Chew Tab chewable tablet Take 81 mg by mouth every day.       No current facility-administered medications for this visit.      He  has a past medical history of Hyperlipidemia and Hypertension.    Social History and Family History were reviewed and updated.    ROS   No chest pain, no shortness of breath, no abdominal pain and all other systems were reviewed and are negative.       Objective:     Blood pressure 128/70, pulse 60, temperature 36.7 °C (98.1 °F), height 1.829 m (6'), weight 84.6 kg (186 lb 9.6 oz), SpO2 96 %. Body mass index is 25.31 kg/m².    Physical Exam:  Constitutional: Alert, no distress.  Skin: Warm, dry, good turgor, no rashes in visible areas.  Eye: Equal, round and reactive, conjunctiva clear, lids normal.  ENMT: Lips without lesions, good dentition, oropharynx clear.  Neck: Trachea midline, no masses.   Lymph: No cervical or supraclavicular lymphadenopathy  Respiratory: Unlabored respiratory effort, lungs clear to auscultation, no wheezes, no ronchi.  Cardiovascular: Normal S1, S2, no murmur, no edema.  Abdomen: Soft, non-tender, no masses.  Psych: Alert and oriented x3, normal affect and mood.        Assessment and Plan:   The following treatment plan was discussed    1. Essential hypertension  Chronic condition.  Controlled.  Advised not to stop medication.  If he stops anything I would stop labetalol and then check his blood pressure.  Follow-up with us for an MA visit to check his blood pressure.  Ordered CMP.  - amLODIPine (NORVASC) 10 MG Tab; Take 1 Tab by mouth every day.  Dispense: 90 Tab; Refill: 3  - labetalol (NORMODYNE) 200 MG Tab; Take 1 Tab by mouth 2 times a day.  Dispense: 90 Tab; Refill: 3  - lisinopril (PRINIVIL) 20 MG Tab; Take 1 Tab by mouth every day.  Dispense: 90 Tab; Refill: 3  - COMP METABOLIC PANEL; Future    2. Hypercholesteremia  Chronic condition.  Status unknown.  Renewed Mevacor.  Ordered lipid panel.  - Lipid Profile; Future  - lovastatin (MEVACOR) 40 MG tablet; Take 1 Tab by mouth every bedtime.  Dispense: 90 Tab; Refill: 3    3. Chronic right hip pain  Chronic condition.  Form was completed and signed.    4. Prediabetes  Chronic condition.  Status unknown.  Ordered A1c.  - HEMOGLOBIN A1C; Future    6. Encounter for FIT (fecal immunochemical test) screening  Appears he has not had a colonoscopy.  Fecal occult testing performed.  - OCCULT BLOOD FECES IMMUNOASSAY; Future    7. Screening PSA (prostate specific antigen)  Ordered PSA.  - PROSTATE SPECIFIC AG SCREENING; Future      Followup: Return in about 6 months  (around 6/6/2019), or if symptoms worsen or fail to improve.    Please note that this dictation was created using voice recognition software. I have made every reasonable attempt to correct obvious errors, but I expect that there are errors of grammar and possibly content that I did not discover before finalizing the note.

## 2018-12-06 NOTE — ASSESSMENT & PLAN NOTE
This is a 73-year-old male who is here today to discuss his hypertension.  Would like to cut back on medication.  Thinks it is too much to be taking 3 medications.  Blood pressure though is well controlled.  Denies any history of chest pain or shortness of breath.

## 2018-12-06 NOTE — ASSESSMENT & PLAN NOTE
Has a chronic history of right hip pain.  Has a plaque.  From [place] and then I did a forearm last time he saw me.  He is requesting a longer course for the placard.  States an open he was given chronic disability for his chronic condition.